# Patient Record
Sex: FEMALE | Race: WHITE | URBAN - METROPOLITAN AREA
[De-identification: names, ages, dates, MRNs, and addresses within clinical notes are randomized per-mention and may not be internally consistent; named-entity substitution may affect disease eponyms.]

---

## 2019-03-11 ENCOUNTER — OFFICE VISIT (OUTPATIENT)
Dept: URBAN - METROPOLITAN AREA CLINIC 17 | Facility: CLINIC | Age: 74
End: 2019-03-11
Payer: COMMERCIAL

## 2019-03-11 DIAGNOSIS — H25.13 AGE-RELATED NUCLEAR CATARACT, BILATERAL: ICD-10-CM

## 2019-03-11 DIAGNOSIS — E11.3291 TYPE 2 DIAB W MILD NONPRLF DIABETIC RTNOP W/O MACULAR EDEMA, RIGHT EYE: Primary | ICD-10-CM

## 2019-03-11 PROCEDURE — 92014 COMPRE OPH EXAM EST PT 1/>: CPT | Performed by: OPTOMETRIST

## 2019-03-11 ASSESSMENT — INTRAOCULAR PRESSURE
OS: 16
OD: 15

## 2019-03-11 NOTE — IMPRESSION/PLAN
Impression: Type 2 diab w mild nonprlf diabetic rtnop w/o macular edema, right eye: U14.1977. Plan: Diabetes type II: mild background diabetic retinopathy, no signs of neovascularization noted. No treatment necessary at this time. Patient was instructed to monitor vision for sudden changes and to call if visual changes noted. Discussed ocular and systemic benefits of blood sugar control.

## 2019-03-11 NOTE — IMPRESSION/PLAN
Impression: Type 2 diabetes mellitus w/o complication: S59.9. OS. Plan: Diabetes type II: no background retinopathy, no signs of neovascularization noted. Discussed ocular and systemic benefits of blood sugar control.

## 2020-06-16 ENCOUNTER — OFFICE VISIT (OUTPATIENT)
Dept: URBAN - METROPOLITAN AREA CLINIC 17 | Facility: CLINIC | Age: 75
End: 2020-06-16
Payer: COMMERCIAL

## 2020-06-16 DIAGNOSIS — H35.371 PUCKERING OF MACULA, RIGHT EYE: ICD-10-CM

## 2020-06-16 DIAGNOSIS — H25.813 COMBINED FORMS OF AGE-RELATED CATARACT, BILATERAL: ICD-10-CM

## 2020-06-16 DIAGNOSIS — H43.813 VITREOUS DEGENERATION, BILATERAL: ICD-10-CM

## 2020-06-16 DIAGNOSIS — E11.9 TYPE 2 DIABETES MELLITUS W/O COMPLICATION: ICD-10-CM

## 2020-06-16 PROCEDURE — 92014 COMPRE OPH EXAM EST PT 1/>: CPT | Performed by: OPTOMETRIST

## 2020-06-16 ASSESSMENT — INTRAOCULAR PRESSURE
OD: 21
OS: 17

## 2020-06-16 NOTE — IMPRESSION/PLAN
Impression: Type 2 diabetes mellitus w/o complication: N22.9. OS. Plan: Diabetes type II: no background retinopathy, no signs of neovascularization noted. Discussed ocular and systemic benefits of blood sugar control.

## 2020-06-16 NOTE — IMPRESSION/PLAN
Impression: Combined forms of age-related cataract, bilateral: H25.813. Plan: Cataracts account for the patient's complaints. No treatment currently recommended. The patient will monitor vision changes and contact us with any decrease in vision.  Recommend refraction PRN

## 2020-06-16 NOTE — IMPRESSION/PLAN
Impression: Type 2 diab w mild nonprlf diabetic rtnop w/o macular edema, right eye: V81.7100. Plan: Diabetes type II: mild background diabetic retinopathy, no signs of neovascularization noted. No treatment necessary at this time. Patient was instructed to monitor vision for sudden changes and to call if visual changes noted. Discussed ocular and systemic benefits of blood sugar control.

## 2020-06-19 ENCOUNTER — OFFICE VISIT (OUTPATIENT)
Dept: URBAN - METROPOLITAN AREA CLINIC 17 | Facility: CLINIC | Age: 75
End: 2020-06-19
Payer: COMMERCIAL

## 2020-06-19 DIAGNOSIS — H52.4 PRESBYOPIA: Primary | ICD-10-CM

## 2020-06-19 PROCEDURE — 92012 INTRM OPH EXAM EST PATIENT: CPT | Performed by: OPTOMETRIST

## 2020-06-19 ASSESSMENT — VISUAL ACUITY
OS: 20/25
OD: 20/25

## 2020-06-19 ASSESSMENT — INTRAOCULAR PRESSURE
OS: 16
OD: 16

## 2022-05-31 ENCOUNTER — OFFICE VISIT (OUTPATIENT)
Dept: URBAN - METROPOLITAN AREA CLINIC 17 | Facility: CLINIC | Age: 77
End: 2022-05-31
Payer: COMMERCIAL

## 2022-05-31 DIAGNOSIS — H43.813 VITREOUS DEGENERATION, BILATERAL: ICD-10-CM

## 2022-05-31 DIAGNOSIS — E11.9 TYPE 2 DIABETES MELLITUS WITHOUT COMPLICATIONS: Primary | ICD-10-CM

## 2022-05-31 DIAGNOSIS — H25.813 COMBINED FORMS OF AGE-RELATED CATARACT, BILATERAL: ICD-10-CM

## 2022-05-31 PROCEDURE — 99214 OFFICE O/P EST MOD 30 MIN: CPT | Performed by: OPTOMETRIST

## 2022-05-31 ASSESSMENT — KERATOMETRY
OD: 49.00
OS: 47.50

## 2022-05-31 ASSESSMENT — INTRAOCULAR PRESSURE
OD: 15
OS: 15

## 2022-05-31 NOTE — IMPRESSION/PLAN
Impression: Type 2 diabetes mellitus without complications: Y35.8. Plan: Diabetes type II: no background retinopathy, no signs of neovascularization noted. Discussed ocular and systemic benefits of blood sugar control.

## 2022-06-01 ENCOUNTER — OFFICE VISIT (OUTPATIENT)
Dept: URBAN - METROPOLITAN AREA CLINIC 17 | Facility: CLINIC | Age: 77
End: 2022-06-01
Payer: COMMERCIAL

## 2022-06-01 DIAGNOSIS — H52.4 PRESBYOPIA: Primary | ICD-10-CM

## 2022-06-01 PROCEDURE — 92012 INTRM OPH EXAM EST PATIENT: CPT | Performed by: OPTOMETRIST

## 2022-06-01 ASSESSMENT — INTRAOCULAR PRESSURE
OD: 23
OS: 21

## 2022-06-01 ASSESSMENT — VISUAL ACUITY
OS: 20/25
OD: 20/25

## 2022-06-01 ASSESSMENT — KERATOMETRY
OD: 48.38
OS: 47.75

## 2023-05-30 ENCOUNTER — OFFICE VISIT (OUTPATIENT)
Dept: URBAN - METROPOLITAN AREA CLINIC 17 | Facility: CLINIC | Age: 78
End: 2023-05-30
Payer: COMMERCIAL

## 2023-05-30 DIAGNOSIS — H43.813 VITREOUS DEGENERATION, BILATERAL: ICD-10-CM

## 2023-05-30 DIAGNOSIS — E11.9 TYPE 2 DIABETES MELLITUS W/O COMPLICATION: Primary | ICD-10-CM

## 2023-05-30 DIAGNOSIS — H25.813 COMBINED FORMS OF AGE-RELATED CATARACT, BILATERAL: ICD-10-CM

## 2023-05-30 PROCEDURE — 99213 OFFICE O/P EST LOW 20 MIN: CPT | Performed by: OPTOMETRIST

## 2023-05-30 ASSESSMENT — INTRAOCULAR PRESSURE
OS: 17
OD: 18

## 2023-05-30 NOTE — IMPRESSION/PLAN
Impression: Type 2 diabetes mellitus w/o complication: A28.1. Plan: Diabetes type II: no background retinopathy, no signs of neovascularization noted. Discussed ocular and systemic benefits of blood sugar control.

## 2023-06-14 ENCOUNTER — OFFICE VISIT (OUTPATIENT)
Dept: URBAN - METROPOLITAN AREA CLINIC 17 | Facility: CLINIC | Age: 78
End: 2023-06-14
Payer: COMMERCIAL

## 2023-06-14 DIAGNOSIS — H52.4 PRESBYOPIA: Primary | ICD-10-CM

## 2023-06-14 PROCEDURE — 92012 INTRM OPH EXAM EST PATIENT: CPT | Performed by: OPTOMETRIST

## 2023-06-14 ASSESSMENT — INTRAOCULAR PRESSURE
OS: 13
OD: 14

## 2023-06-14 ASSESSMENT — VISUAL ACUITY
OS: 20/25
OD: 20/20

## 2024-05-30 ENCOUNTER — OFFICE VISIT (OUTPATIENT)
Dept: URBAN - METROPOLITAN AREA CLINIC 17 | Facility: CLINIC | Age: 79
End: 2024-05-30
Payer: COMMERCIAL

## 2024-05-30 DIAGNOSIS — H25.813 COMBINED FORMS OF AGE-RELATED CATARACT, BILATERAL: ICD-10-CM

## 2024-05-30 DIAGNOSIS — E11.9 TYPE 2 DIABETES MELLITUS W/O COMPLICATION: Primary | ICD-10-CM

## 2024-05-30 DIAGNOSIS — H43.813 VITREOUS DEGENERATION, BILATERAL: ICD-10-CM

## 2024-05-30 DIAGNOSIS — H35.371 PUCKERING OF MACULA, RIGHT EYE: ICD-10-CM

## 2024-05-30 PROCEDURE — 92014 COMPRE OPH EXAM EST PT 1/>: CPT | Performed by: OPTOMETRIST

## 2024-05-30 ASSESSMENT — INTRAOCULAR PRESSURE
OD: 12
OS: 14

## 2025-06-24 ENCOUNTER — OFFICE VISIT (OUTPATIENT)
Dept: URBAN - METROPOLITAN AREA CLINIC 17 | Facility: CLINIC | Age: 80
End: 2025-06-24
Payer: COMMERCIAL

## 2025-06-24 DIAGNOSIS — H25.813 COMBINED FORMS OF AGE-RELATED CATARACT, BILATERAL: ICD-10-CM

## 2025-06-24 DIAGNOSIS — H43.813 VITREOUS DEGENERATION, BILATERAL: ICD-10-CM

## 2025-06-24 DIAGNOSIS — H35.371 PUCKERING OF MACULA, RIGHT EYE: ICD-10-CM

## 2025-06-24 DIAGNOSIS — E11.9 TYPE 2 DIABETES MELLITUS W/O COMPLICATION: Primary | ICD-10-CM

## 2025-06-24 PROCEDURE — 92134 CPTRZ OPH DX IMG PST SGM RTA: CPT | Performed by: OPTOMETRIST

## 2025-06-24 PROCEDURE — 92014 COMPRE OPH EXAM EST PT 1/>: CPT | Performed by: OPTOMETRIST

## 2025-06-24 ASSESSMENT — INTRAOCULAR PRESSURE
OD: 14
OS: 12